# Patient Record
Sex: FEMALE | RURAL
[De-identification: names, ages, dates, MRNs, and addresses within clinical notes are randomized per-mention and may not be internally consistent; named-entity substitution may affect disease eponyms.]

---

## 2024-02-07 ENCOUNTER — LAB (OUTPATIENT)
Dept: RURAL CLINIC 2 | Facility: CLINIC | Age: 59
End: 2024-02-07

## 2024-02-07 ENCOUNTER — OV NP (OUTPATIENT)
Dept: RURAL CLINIC 2 | Facility: CLINIC | Age: 59
End: 2024-02-07
Payer: COMMERCIAL

## 2024-02-07 VITALS
BODY MASS INDEX: 24.75 KG/M2 | SYSTOLIC BLOOD PRESSURE: 123 MMHG | TEMPERATURE: 96.9 F | HEIGHT: 66 IN | WEIGHT: 154 LBS | HEART RATE: 83 BPM | DIASTOLIC BLOOD PRESSURE: 88 MMHG

## 2024-02-07 DIAGNOSIS — K63.9 COLON WALL THICKENING: ICD-10-CM

## 2024-02-07 DIAGNOSIS — N20.0 KIDNEY STONES: ICD-10-CM

## 2024-02-07 DIAGNOSIS — J45.909 ACTIVE ASTHMA: ICD-10-CM

## 2024-02-07 PROBLEM — 707444001: Status: ACTIVE | Noted: 2024-02-07

## 2024-02-07 PROBLEM — 128524007: Status: ACTIVE | Noted: 2024-02-07

## 2024-02-07 PROBLEM — 14760008: Status: ACTIVE | Noted: 2024-02-07

## 2024-02-07 PROBLEM — 95570007: Status: ACTIVE | Noted: 2024-02-07

## 2024-02-07 PROCEDURE — 99243 OFF/OP CNSLTJ NEW/EST LOW 30: CPT | Performed by: INTERNAL MEDICINE

## 2024-02-07 PROCEDURE — 99203 OFFICE O/P NEW LOW 30 MIN: CPT | Performed by: INTERNAL MEDICINE

## 2024-02-07 RX ORDER — LORATADINE 10 MG
1 TABLET TABLET ORAL ONCE A DAY
Status: ACTIVE | COMMUNITY

## 2024-02-07 RX ORDER — DESIPRAMINE HYDROCHLORIDE 10 MG/1
1 TABLET TABLET, FILM COATED ORAL ONCE A DAY
Status: ACTIVE | COMMUNITY

## 2024-02-07 RX ORDER — MONTELUKAST 10 MG/1
1 TABLET TABLET, FILM COATED ORAL ONCE A DAY
Status: ACTIVE | COMMUNITY

## 2024-02-07 RX ORDER — CITALOPRAM 10 MG/1
1 TABLET TABLET, FILM COATED ORAL ONCE A DAY
Status: ACTIVE | COMMUNITY

## 2024-02-07 RX ORDER — SODIUM PICOSULFATE, MAGNESIUM OXIDE, AND ANHYDROUS CITRIC ACID 12; 3.5; 1 G/175ML; G/175ML; MG/175ML
175 ML THE FIRST DOSE THE EVENING BEFORE AND SECOND DOSE THE MORNING OF COLONOSCOPY LIQUID ORAL ONCE A DAY
Qty: 350 | Refills: 0 | OUTPATIENT
Start: 2024-02-07 | End: 2024-02-09

## 2024-02-07 RX ORDER — CHOLECALCIFEROL (VITAMIN D3) 50 MCG
1 CAPSULE CAPSULE ORAL ONCE A DAY
Status: ACTIVE | COMMUNITY

## 2024-02-07 NOTE — HPI-ZZZTODAY'S VISIT
The patient is a 59-year-old female who presents on referral from Kayla Trejo NP for abnormal CT imaging of the colon.  A copy of this document to be sent to the referring provider.  The patient recently underwent a CT scan of the abdomen and pelvis for flank pain and history of kidney stones with incidental findings of right proximal colonic wall thickening and large volume colonic stool.  She is currently feeling well and denies abdominal pain, constipation, diarrhea or rectal bleeding.  Her previous colonoscopy was completed 2-1/2 years ago with unremarkable findings.  Past medical history includes stable asthma and kidney stones.

## 2024-03-06 ENCOUNTER — COLON (OUTPATIENT)
Dept: URBAN - METROPOLITAN AREA SURGERY CENTER 14 | Facility: SURGERY CENTER | Age: 59
End: 2024-03-06
Payer: COMMERCIAL

## 2024-03-06 ENCOUNTER — LAB (OUTPATIENT)
Dept: URBAN - METROPOLITAN AREA CLINIC 4 | Facility: CLINIC | Age: 59
End: 2024-03-06
Payer: COMMERCIAL

## 2024-03-06 DIAGNOSIS — R93.3 ABN FINDINGS-GI TRACT: ICD-10-CM

## 2024-03-06 DIAGNOSIS — K63.5 BENIGN COLON POLYP: ICD-10-CM

## 2024-03-06 DIAGNOSIS — K63.89 OTHER SPECIFIED DISEASES OF INTESTINE: ICD-10-CM

## 2024-03-06 PROCEDURE — 88305 TISSUE EXAM BY PATHOLOGIST: CPT | Performed by: PATHOLOGY

## 2024-03-06 PROCEDURE — 45385 COLONOSCOPY W/LESION REMOVAL: CPT | Performed by: INTERNAL MEDICINE
